# Patient Record
(demographics unavailable — no encounter records)

---

## 2025-05-23 NOTE — PHYSICAL EXAM
[Normal Sclera/Conjunctiva] : normal sclera/conjunctiva [EOMI] : extraocular movements intact [Normal] : normal rate, regular rhythm, normal S1 and S2 and no murmur heard [No Varicosities] : no varicosities [No Edema] : there was no peripheral edema [Soft] : abdomen soft [Non Tender] : non-tender [Non-distended] : non-distended [No Masses] : no abdominal mass palpated [No HSM] : no HSM [Normal Posterior Cervical Nodes] : no posterior cervical lymphadenopathy [Normal Anterior Cervical Nodes] : no anterior cervical lymphadenopathy [Coordination Grossly Intact] : coordination grossly intact [No Focal Deficits] : no focal deficits [Normal Gait] : normal gait [Normal Affect] : the affect was normal [Normal Insight/Judgement] : insight and judgment were intact [Normal Outer Ear/Nose] : the outer ears and nose were normal in appearance [Normal Oropharynx] : the oropharynx was normal [de-identified] : obese [de-identified] : R tympanic membrane with some erythema, bulging; discomfort with otoscopic exam

## 2025-05-23 NOTE — HEALTH RISK ASSESSMENT
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

## 2025-05-23 NOTE — HISTORY OF PRESENT ILLNESS
[FreeTextEntry1] : CPE, establish care, ear pain [de-identified] : has been on valsartan and amlodipine for 2+ years  has had echo and carotid ultrasound in 2022 or 2023 and they were normal  notes that he has been having ear discomfort for several weeks, went to urgent care and was diagnosed with swimmers ear - given ear drops which didn't help. Ear feels itchy and painful. Denies fevers, chills or any other symptoms of note

## 2025-07-08 NOTE — REVIEW OF SYSTEMS
[Nausea] : nausea [Vomiting] : vomiting [Negative] : Heme/Lymph [Abdominal Pain] : no abdominal pain [Constipation] : no constipation [Diarrhea] : no diarrhea [Heartburn] : no heartburn [Melena] : no melena

## 2025-07-08 NOTE — HISTORY OF PRESENT ILLNESS
[FreeTextEntry1] : f/u, vomiting [de-identified] : vomited 3 times since yesterday evening denies diarrhea, blood in stool or blood in vomit notes fatigue has been staying well hydrated denies fevers, chills  notes that he travelled to the [facility] returning a few days ago and had a lot of seafood while there  Also notes that he has stopped drinking alcohol.